# Patient Record
Sex: FEMALE | Race: OTHER | HISPANIC OR LATINO | URBAN - METROPOLITAN AREA
[De-identification: names, ages, dates, MRNs, and addresses within clinical notes are randomized per-mention and may not be internally consistent; named-entity substitution may affect disease eponyms.]

---

## 2022-07-17 RX ORDER — OXYTOCIN 10 UNIT/ML
333.33 VIAL (ML) INJECTION
Qty: 20 | Refills: 0 | Status: DISCONTINUED | OUTPATIENT
Start: 2022-07-22 | End: 2022-07-24

## 2022-07-22 ENCOUNTER — INPATIENT (INPATIENT)
Facility: HOSPITAL | Age: 27
LOS: 1 days | Discharge: HOME | End: 2022-07-24
Attending: OBSTETRICS & GYNECOLOGY | Admitting: OBSTETRICS & GYNECOLOGY
Payer: COMMERCIAL

## 2022-07-22 VITALS
RESPIRATION RATE: 14 BRPM | SYSTOLIC BLOOD PRESSURE: 108 MMHG | DIASTOLIC BLOOD PRESSURE: 65 MMHG | TEMPERATURE: 98 F | HEART RATE: 66 BPM

## 2022-07-22 DIAGNOSIS — O34.219 MATERNAL CARE FOR UNSPECIFIED TYPE SCAR FROM PREVIOUS CESAREAN DELIVERY: ICD-10-CM

## 2022-07-22 DIAGNOSIS — Z98.891 HISTORY OF UTERINE SCAR FROM PREVIOUS SURGERY: Chronic | ICD-10-CM

## 2022-07-22 DIAGNOSIS — Z30.2 ENCOUNTER FOR STERILIZATION: ICD-10-CM

## 2022-07-22 LAB
ABO RH CONFIRMATION: SIGNIFICANT CHANGE UP
BASOPHILS # BLD AUTO: 0.02 K/UL — SIGNIFICANT CHANGE UP (ref 0–0.2)
BASOPHILS NFR BLD AUTO: 0.3 % — SIGNIFICANT CHANGE UP (ref 0–2)
BLD GP AB SCN SERPL QL: SIGNIFICANT CHANGE UP
COVID-19 SPIKE DOMAIN AB INTERP: POSITIVE
COVID-19 SPIKE DOMAIN ANTIBODY RESULT: >250 U/ML — HIGH
EOSINOPHIL # BLD AUTO: 0.02 K/UL — SIGNIFICANT CHANGE UP (ref 0–0.5)
EOSINOPHIL NFR BLD AUTO: 0.3 % — SIGNIFICANT CHANGE UP (ref 0–6)
HCT VFR BLD CALC: 33.4 % — LOW (ref 34.5–45)
HGB BLD-MCNC: 11.2 G/DL — LOW (ref 11.5–15.5)
HIV 1 & 2 AB SERPL IA.RAPID: SIGNIFICANT CHANGE UP
IMM GRANULOCYTES NFR BLD AUTO: 0.3 % — SIGNIFICANT CHANGE UP (ref 0–1.5)
LYMPHOCYTES # BLD AUTO: 1.35 K/UL — SIGNIFICANT CHANGE UP (ref 1–3.3)
LYMPHOCYTES # BLD AUTO: 18.4 % — SIGNIFICANT CHANGE UP (ref 13–44)
MCHC RBC-ENTMCNC: 28 PG — SIGNIFICANT CHANGE UP (ref 27–34)
MCHC RBC-ENTMCNC: 33.5 GM/DL — SIGNIFICANT CHANGE UP (ref 32–36)
MCV RBC AUTO: 83.5 FL — SIGNIFICANT CHANGE UP (ref 80–100)
MONOCYTES # BLD AUTO: 0.26 K/UL — SIGNIFICANT CHANGE UP (ref 0–0.9)
MONOCYTES NFR BLD AUTO: 3.6 % — SIGNIFICANT CHANGE UP (ref 2–14)
NEUTROPHILS # BLD AUTO: 5.65 K/UL — SIGNIFICANT CHANGE UP (ref 1.8–7.4)
NEUTROPHILS NFR BLD AUTO: 77.1 % — HIGH (ref 43–77)
PLATELET # BLD AUTO: 222 K/UL — SIGNIFICANT CHANGE UP (ref 150–400)
RBC # BLD: 4 M/UL — SIGNIFICANT CHANGE UP (ref 3.8–5.2)
RBC # FLD: 14 % — SIGNIFICANT CHANGE UP (ref 10.3–14.5)
SARS-COV-2 IGG+IGM SERPL QL IA: >250 U/ML — HIGH
SARS-COV-2 IGG+IGM SERPL QL IA: POSITIVE
SARS-COV-2 RNA SPEC QL NAA+PROBE: SIGNIFICANT CHANGE UP
WBC # BLD: 7.32 K/UL — SIGNIFICANT CHANGE UP (ref 3.8–10.5)
WBC # FLD AUTO: 7.32 K/UL — SIGNIFICANT CHANGE UP (ref 3.8–10.5)

## 2022-07-22 PROCEDURE — 88307 TISSUE EXAM BY PATHOLOGIST: CPT | Mod: 26

## 2022-07-22 PROCEDURE — 88302 TISSUE EXAM BY PATHOLOGIST: CPT | Mod: 26

## 2022-07-22 RX ORDER — ENOXAPARIN SODIUM 100 MG/ML
40 INJECTION SUBCUTANEOUS EVERY 24 HOURS
Refills: 0 | Status: DISCONTINUED | OUTPATIENT
Start: 2022-07-23 | End: 2022-07-24

## 2022-07-22 RX ORDER — LANOLIN
1 OINTMENT (GRAM) TOPICAL EVERY 6 HOURS
Refills: 0 | Status: DISCONTINUED | OUTPATIENT
Start: 2022-07-22 | End: 2022-07-24

## 2022-07-22 RX ORDER — TRANEXAMIC ACID 100 MG/ML
1000 INJECTION, SOLUTION INTRAVENOUS ONCE
Refills: 0 | Status: COMPLETED | OUTPATIENT
Start: 2022-07-22 | End: 2022-07-22

## 2022-07-22 RX ORDER — SODIUM CHLORIDE 9 MG/ML
500 INJECTION, SOLUTION INTRAVENOUS ONCE
Refills: 0 | Status: COMPLETED | OUTPATIENT
Start: 2022-07-22 | End: 2022-07-22

## 2022-07-22 RX ORDER — DIPHENHYDRAMINE HCL 50 MG
25 CAPSULE ORAL EVERY 6 HOURS
Refills: 0 | Status: DISCONTINUED | OUTPATIENT
Start: 2022-07-22 | End: 2022-07-24

## 2022-07-22 RX ORDER — MAGNESIUM HYDROXIDE 400 MG/1
30 TABLET, CHEWABLE ORAL
Refills: 0 | Status: DISCONTINUED | OUTPATIENT
Start: 2022-07-22 | End: 2022-07-24

## 2022-07-22 RX ORDER — SODIUM CHLORIDE 9 MG/ML
1000 INJECTION, SOLUTION INTRAVENOUS
Refills: 0 | Status: DISCONTINUED | OUTPATIENT
Start: 2022-07-22 | End: 2022-07-22

## 2022-07-22 RX ORDER — KETOROLAC TROMETHAMINE 30 MG/ML
30 SYRINGE (ML) INJECTION EVERY 6 HOURS
Refills: 0 | Status: DISCONTINUED | OUTPATIENT
Start: 2022-07-22 | End: 2022-07-23

## 2022-07-22 RX ORDER — OXYCODONE HYDROCHLORIDE 5 MG/1
5 TABLET ORAL ONCE
Refills: 0 | Status: DISCONTINUED | OUTPATIENT
Start: 2022-07-22 | End: 2022-07-24

## 2022-07-22 RX ORDER — SODIUM CHLORIDE 9 MG/ML
1000 INJECTION, SOLUTION INTRAVENOUS
Refills: 0 | Status: DISCONTINUED | OUTPATIENT
Start: 2022-07-22 | End: 2022-07-24

## 2022-07-22 RX ORDER — OXYTOCIN 10 UNIT/ML
333.33 VIAL (ML) INJECTION
Qty: 20 | Refills: 0 | Status: DISCONTINUED | OUTPATIENT
Start: 2022-07-22 | End: 2022-07-24

## 2022-07-22 RX ORDER — SODIUM CHLORIDE 9 MG/ML
1000 INJECTION, SOLUTION INTRAVENOUS ONCE
Refills: 0 | Status: COMPLETED | OUTPATIENT
Start: 2022-07-22 | End: 2022-07-22

## 2022-07-22 RX ORDER — OXYCODONE HYDROCHLORIDE 5 MG/1
5 TABLET ORAL
Refills: 0 | Status: DISCONTINUED | OUTPATIENT
Start: 2022-07-22 | End: 2022-07-24

## 2022-07-22 RX ORDER — CEFAZOLIN SODIUM 1 G
2000 VIAL (EA) INJECTION ONCE
Refills: 0 | Status: COMPLETED | OUTPATIENT
Start: 2022-07-22 | End: 2022-07-22

## 2022-07-22 RX ORDER — TETANUS TOXOID, REDUCED DIPHTHERIA TOXOID AND ACELLULAR PERTUSSIS VACCINE, ADSORBED 5; 2.5; 8; 8; 2.5 [IU]/.5ML; [IU]/.5ML; UG/.5ML; UG/.5ML; UG/.5ML
0.5 SUSPENSION INTRAMUSCULAR ONCE
Refills: 0 | Status: DISCONTINUED | OUTPATIENT
Start: 2022-07-22 | End: 2022-07-24

## 2022-07-22 RX ORDER — ACETAMINOPHEN 500 MG
975 TABLET ORAL
Refills: 0 | Status: DISCONTINUED | OUTPATIENT
Start: 2022-07-22 | End: 2022-07-24

## 2022-07-22 RX ORDER — IBUPROFEN 200 MG
600 TABLET ORAL EVERY 6 HOURS
Refills: 0 | Status: COMPLETED | OUTPATIENT
Start: 2022-07-22 | End: 2023-06-20

## 2022-07-22 RX ORDER — SIMETHICONE 80 MG/1
80 TABLET, CHEWABLE ORAL EVERY 4 HOURS
Refills: 0 | Status: DISCONTINUED | OUTPATIENT
Start: 2022-07-22 | End: 2022-07-24

## 2022-07-22 RX ORDER — CITRIC ACID/SODIUM CITRATE 300-500 MG
30 SOLUTION, ORAL ORAL ONCE
Refills: 0 | Status: COMPLETED | OUTPATIENT
Start: 2022-07-22 | End: 2022-07-22

## 2022-07-22 RX ORDER — FAMOTIDINE 10 MG/ML
20 INJECTION INTRAVENOUS ONCE
Refills: 0 | Status: COMPLETED | OUTPATIENT
Start: 2022-07-22 | End: 2022-07-22

## 2022-07-22 RX ADMIN — Medication 1000 MILLIUNIT(S)/MIN: at 19:50

## 2022-07-22 RX ADMIN — SODIUM CHLORIDE 125 MILLILITER(S): 9 INJECTION, SOLUTION INTRAVENOUS at 18:15

## 2022-07-22 RX ADMIN — Medication 30 MILLILITER(S): at 18:25

## 2022-07-22 RX ADMIN — Medication 1000 MILLIUNIT(S)/MIN: at 19:05

## 2022-07-22 RX ADMIN — Medication 100 MILLIGRAM(S): at 18:51

## 2022-07-22 RX ADMIN — SODIUM CHLORIDE 500 MILLILITER(S): 9 INJECTION, SOLUTION INTRAVENOUS at 21:53

## 2022-07-22 RX ADMIN — FAMOTIDINE 20 MILLIGRAM(S): 10 INJECTION INTRAVENOUS at 18:25

## 2022-07-22 RX ADMIN — TRANEXAMIC ACID 220 MILLIGRAM(S): 100 INJECTION, SOLUTION INTRAVENOUS at 18:28

## 2022-07-22 RX ADMIN — SODIUM CHLORIDE 125 MILLILITER(S): 9 INJECTION, SOLUTION INTRAVENOUS at 17:11

## 2022-07-22 RX ADMIN — SODIUM CHLORIDE 2000 MILLILITER(S): 9 INJECTION, SOLUTION INTRAVENOUS at 16:00

## 2022-07-22 NOTE — OB PROVIDER H&P - ASSESSMENT
26 year old G_ at 38w4d by LMP/US who presents to L&D for rCS for SGA (EFW 5%ile 6/15) + BS. 26 year old  at 38w4d by 1st trimester US who presents to L&D for rCS for SGA (EFW 5%ile 6/15) + BS.    A/P:   -Admit to L&D  -Consent  -Admission labs  -NPO, except ice chips   -IV fluids  -Fetus: Cat I tracing. Continuous toco and fetal monitoring.   -GBS: no GBS ppx required   -Analgesia: spinal     Discussed with Dr. Wells

## 2022-07-22 NOTE — OB RN DELIVERY SUMMARY - NS_SEPSISRSKCALC_OBGYN_ALL_OB_FT
EOS calculated successfully. EOS Risk Factor: 0.5/1000 live births ([facility] national incidence); GA=38w4d; Temp=97.7; ROM=0; GBS='Negative'; Antibiotics='No antibiotics or any antibiotics < 2 hrs prior to birth'

## 2022-07-22 NOTE — OB PROVIDER H&P - NS_OTHER_OBGYN_ALL_OB_FT
UTI in 2nd and 3rd trimester with culture positive for E coli, E faecalis, genital HSV, succenturiate placenta

## 2022-07-22 NOTE — OB RN INTRAOPERATIVE NOTE - NSSELHIDDEN_OBGYN_ALL_OB_FT
[NS_DeliveryAttending1_OBGYN_ALL_OB_FT:[id number]==],[NS_DeliveryAssist1_OBGYN_ALL_OB_FT:[id number]=],[NS_DeliveryRN_OBGYN_ALL_OB_FT:[id number]==]

## 2022-07-22 NOTE — OB PROVIDER H&P - NSHPPHYSICALEXAM_GEN_ALL_CORE
HR: 66 (22 Jul 2022 15:54) (66 - 66)  BP: 108/65 (22 Jul 2022 15:54) (108/65 - 108/65)    Bedside Sono: vertex, posterior placenta  FHT: baseline FHR 150s, moderate variability, +accels, -decels  toco: irregular contractions

## 2022-07-22 NOTE — OB PROVIDER DELIVERY SUMMARY - NSPROVIDERDELIVERYNOTE_OBGYN_ALL_OB_FT
Brief  Delivery Summary    Procedure: repeat  Delivery with bilateral salpingectomy  Findings: Viable female infant delivered in cephalic presentation at 1904, placenta delivered at 1905.  Apgar scores 8/9  Weight: 2770g  EBL: 736 ml  UOP: 200 ml  IVF: 1000ml  Complications: FGR    Details  Hysterotomy reapproximated in 1 layer(s) w/ 0 monocryl- running locked   skin closed with staples    dictation number  [id number]

## 2022-07-22 NOTE — OB RN PATIENT PROFILE - FALL HARM RISK - UNIVERSAL INTERVENTIONS
Bed in lowest position, wheels locked, appropriate side rails in place/Call bell, personal items and telephone in reach/Instruct patient to call for assistance before getting out of bed or chair/Non-slip footwear when patient is out of bed/[place] to call system/Physically safe environment - no spills, clutter or unnecessary equipment/Purposeful Proactive Rounding/Room/bathroom lighting operational, light cord in reach

## 2022-07-22 NOTE — OB RN DELIVERY SUMMARY - NSSELHIDDEN_OBGYN_ALL_OB_FT
[NS_DeliveryAttending1_OBGYN_ALL_OB_FT:[id number]==],[NS_DeliveryAssist1_OBGYN_ALL_OB_FT:[id number]=],[NS_DeliveryRN_OBGYN_ALL_OB_FT:[id number]==]

## 2022-07-22 NOTE — OB PROVIDER H&P - HISTORY OF PRESENT ILLNESS
26 year old G_ at 38w4d by LMP/US who presents to L&D for rCS for SGA (EFW 5%ile 6/15) + BS.     LOUISE: 08/01/22   LMP:      Pregnancy course:   SGA, EFW 5%ile 6/15   UTI in 2nd and 3rd trimester with culture positive for E coli, E faecalis   Genital HSV   Succenturiate placenta   Obhx: C/S   Gynhx:   Pmhx:    Pshx: C/S   Meds: Cephalexin, Macrobid 100mg, Valtrex   Allergies:    SocialHx:     BMI:    Ultrasound: vtx, posterior   EFW: 1726g, 5%ile 6/15  26 year old  at 38w4d by 1st trimester US who presents to L&D for rCS for SGA (EFW 5%ile 6/15) + BS. Patient denies LOF, VB, or contractions. Reports good fetal movements. Denies any fevers, chills, SOB, CP, or urinary symptoms.    LOUISE: 22   LMP:  unknown    Pregnancy course:   SGA, EFW 5%ile 6/15   UTI in 2nd and 3rd trimester with culture positive for E coli, E faecalis   Genital HSV   Succenturiate placenta     Obhx:  G1:  pCS 2/2 arrest of dilation  G2:  rCS  G3: current  Gynhx: denies hx of ovarian cysts, fibroids, STIs  Pmhx:  denies  Pshx: C/S x2, right hand surgery  Meds: PNVs, Valtrex   Allergies:  NKDA  SocialHx: denies alcohol use, illicit drug use, and smoking during this pregnancy    Ultrasound: vtx, posterior   EFW: 1726g, 5%ile 6/15

## 2022-07-22 NOTE — OB PROVIDER DELIVERY SUMMARY - NSSELHIDDEN_OBGYN_ALL_OB_FT
[NS_DeliveryAttending1_OBGYN_ALL_OB_FT:[id number]==],[NS_DeliveryAssist1_OBGYN_ALL_OB_FT:[id number]=]

## 2022-07-22 NOTE — OB RN DELIVERY SUMMARY - NS_SKINTOSKINA_OBGYN_ALL_OB
Implemented All Fall Risk Interventions:  [place] to call system. Call bell, personal items and telephone within reach. Instruct patient to call for assistance. Room bathroom lighting operational. Non-slip footwear when patient is off stretcher. Physically safe environment: no spills, clutter or unnecessary equipment. Stretcher in lowest position, wheels locked, appropriate side rails in place. Provide visual cue, wrist band, yellow gown, etc. Monitor gait and stability. Monitor for mental status changes and reorient to person, place, and time. Review medications for side effects contributing to fall risk. Reinforce activity limits and safety measures with patient and family. Was done through completion of first feed

## 2022-07-23 LAB
BASOPHILS # BLD AUTO: 0.01 K/UL — SIGNIFICANT CHANGE UP (ref 0–0.2)
BASOPHILS NFR BLD AUTO: 0.1 % — SIGNIFICANT CHANGE UP (ref 0–2)
EOSINOPHIL # BLD AUTO: 0 K/UL — SIGNIFICANT CHANGE UP (ref 0–0.5)
EOSINOPHIL NFR BLD AUTO: 0 % — SIGNIFICANT CHANGE UP (ref 0–6)
HCT VFR BLD CALC: 27 % — LOW (ref 34.5–45)
HGB BLD-MCNC: 8.7 G/DL — LOW (ref 11.5–15.5)
HIV 1+2 AB+HIV1 P24 AG SERPL QL IA: SIGNIFICANT CHANGE UP
IMM GRANULOCYTES NFR BLD AUTO: 0.3 % — SIGNIFICANT CHANGE UP (ref 0–1.5)
LYMPHOCYTES # BLD AUTO: 1.13 K/UL — SIGNIFICANT CHANGE UP (ref 1–3.3)
LYMPHOCYTES # BLD AUTO: 11.2 % — LOW (ref 13–44)
MCHC RBC-ENTMCNC: 27.3 PG — SIGNIFICANT CHANGE UP (ref 27–34)
MCHC RBC-ENTMCNC: 32.2 GM/DL — SIGNIFICANT CHANGE UP (ref 32–36)
MCV RBC AUTO: 84.6 FL — SIGNIFICANT CHANGE UP (ref 80–100)
MEV IGG SER-ACNC: 188 AU/ML — SIGNIFICANT CHANGE UP
MEV IGG+IGM SER-IMP: POSITIVE — SIGNIFICANT CHANGE UP
MONOCYTES # BLD AUTO: 0.49 K/UL — SIGNIFICANT CHANGE UP (ref 0–0.9)
MONOCYTES NFR BLD AUTO: 4.8 % — SIGNIFICANT CHANGE UP (ref 2–14)
NEUTROPHILS # BLD AUTO: 8.45 K/UL — HIGH (ref 1.8–7.4)
NEUTROPHILS NFR BLD AUTO: 83.6 % — HIGH (ref 43–77)
PLATELET # BLD AUTO: 210 K/UL — SIGNIFICANT CHANGE UP (ref 150–400)
RBC # BLD: 3.19 M/UL — LOW (ref 3.8–5.2)
RBC # FLD: 13.8 % — SIGNIFICANT CHANGE UP (ref 10.3–14.5)
T PALLIDUM AB TITR SER: NEGATIVE — SIGNIFICANT CHANGE UP
WBC # BLD: 10.11 K/UL — SIGNIFICANT CHANGE UP (ref 3.8–10.5)
WBC # FLD AUTO: 10.11 K/UL — SIGNIFICANT CHANGE UP (ref 3.8–10.5)

## 2022-07-23 RX ORDER — IRON SUCROSE 20 MG/ML
300 INJECTION, SOLUTION INTRAVENOUS ONCE
Refills: 0 | Status: COMPLETED | OUTPATIENT
Start: 2022-07-23 | End: 2022-07-23

## 2022-07-23 RX ORDER — ASCORBIC ACID 60 MG
500 TABLET,CHEWABLE ORAL THREE TIMES A DAY
Refills: 0 | Status: DISCONTINUED | OUTPATIENT
Start: 2022-07-23 | End: 2022-07-23

## 2022-07-23 RX ORDER — IBUPROFEN 200 MG
600 TABLET ORAL EVERY 6 HOURS
Refills: 0 | Status: DISCONTINUED | OUTPATIENT
Start: 2022-07-23 | End: 2022-07-24

## 2022-07-23 RX ORDER — ACETAMINOPHEN 500 MG
3 TABLET ORAL
Qty: 36 | Refills: 0
Start: 2022-07-23 | End: 2022-07-25

## 2022-07-23 RX ORDER — IBUPROFEN 200 MG
1 TABLET ORAL
Qty: 28 | Refills: 0
Start: 2022-07-23 | End: 2022-07-29

## 2022-07-23 RX ORDER — FERROUS SULFATE 325(65) MG
325 TABLET ORAL THREE TIMES A DAY
Refills: 0 | Status: DISCONTINUED | OUTPATIENT
Start: 2022-07-23 | End: 2022-07-23

## 2022-07-23 RX ADMIN — Medication 975 MILLIGRAM(S): at 15:02

## 2022-07-23 RX ADMIN — Medication 975 MILLIGRAM(S): at 20:26

## 2022-07-23 RX ADMIN — Medication 975 MILLIGRAM(S): at 16:20

## 2022-07-23 RX ADMIN — Medication 30 MILLIGRAM(S): at 13:05

## 2022-07-23 RX ADMIN — Medication 30 MILLIGRAM(S): at 18:27

## 2022-07-23 RX ADMIN — Medication 30 MILLIGRAM(S): at 14:00

## 2022-07-23 RX ADMIN — Medication 30 MILLIGRAM(S): at 06:27

## 2022-07-23 RX ADMIN — Medication 975 MILLIGRAM(S): at 10:00

## 2022-07-23 RX ADMIN — IRON SUCROSE 176.67 MILLIGRAM(S): 20 INJECTION, SOLUTION INTRAVENOUS at 15:02

## 2022-07-23 RX ADMIN — Medication 1 TABLET(S): at 13:05

## 2022-07-23 RX ADMIN — ENOXAPARIN SODIUM 40 MILLIGRAM(S): 100 INJECTION SUBCUTANEOUS at 09:38

## 2022-07-23 RX ADMIN — Medication 975 MILLIGRAM(S): at 09:38

## 2022-07-23 RX ADMIN — Medication 30 MILLIGRAM(S): at 00:18

## 2022-07-23 RX ADMIN — Medication 975 MILLIGRAM(S): at 03:13

## 2022-07-23 NOTE — PROGRESS NOTE ADULT - ASSESSMENT
A/P:  RONALDO RODARTE is a 26y  now POD#1 s/p repeat  section at 38.4 weeks gestation, uncomplicated.  -Vital signs stable  - f/u TOV 1300  -Hgb: 11.2-> AM labs pending   -Voiding, tolerating PO  -Advance care as tolerated   -Continue routine postpartum and postoperative care and education  -Healthy female infant  -DVT ppx: SCDs in place, lovenox  -Dispo: Anticipate discharge to home once meeting all milestones and pending attending approval. A/P:  RONALDO RODARTE is a 26y  now POD#1 s/p repeat  section at 38.4 weeks gestation, uncomplicated.  -Vital signs stable  - f/u TOV 1300  -Hgb: 11.2-> AM labs pending   -Voiding, tolerating PO  -Advance care as tolerated   -Continue routine postpartum and postoperative care and education  -Healthy female infant  -DVT ppx: lovenox  -Dispo: Anticipate discharge to home once meeting all milestones and pending attending approval.

## 2022-07-23 NOTE — DISCHARGE NOTE OB - HOSPITAL COURSE
Patient underwent a  delivery with bilateral salpingectomy. Post-op course was uncomplicated. Pain is well controlled with PRN medication. She has no difficulty with ambulation, voiding, or PO intake. Lab values and vital signs are within normal limits prior to discharge.

## 2022-07-23 NOTE — DISCHARGE NOTE OB - PLAN OF CARE
1) Please take ibuprofen and/or Tylenol as needed for pain as prescribed.  2) Nothing in the vagina for 6 weeks (including no sex, no tampons, and no douching).  3) Please call your doctor for a follow up your postpartum appointment in 1 week for incision check.  4) Please continue taking vitamins postpartum.   5) Please call the office sooner if you have heavy vaginal bleeding, severe abdominal pain, or fever > 100.4F.  6) You may resume regular daily activity as tolerated

## 2022-07-23 NOTE — DISCHARGE NOTE OB - PATIENT PORTAL LINK FT
You can access the FollowMyHealth Patient Portal offered by [facility] by registering at the following website: [email or web address]. By joining [employer]’s FollowMyHealth portal, you will also be able to view your health information using other applications (apps) compatible with our system.

## 2022-07-23 NOTE — DISCHARGE NOTE OB - CARE PROVIDER_API CALL
Della Wells)  Obstetrics and Gynecology  [address]  [place] [address]  Phone: [phone]  Fax: [phone]  Follow Up Time:

## 2022-07-23 NOTE — DISCHARGE NOTE OB - NS MD DC FALL RISK RISK
For information on Fall & Injury Prevention, visit: [email or web address] OR  [email or web address] OR  https://www.cdc.gov/steadi/patient.html

## 2022-07-23 NOTE — DISCHARGE NOTE OB - CARE PLAN
1 Principal Discharge DX:	Delivery normal  Assessment and plan of treatment:	1) Please take ibuprofen and/or Tylenol as needed for pain as prescribed.  2) Nothing in the vagina for 6 weeks (including no sex, no tampons, and no douching).  3) Please call your doctor for a follow up your postpartum appointment in 1 week for incision check.  4) Please continue taking vitamins postpartum.   5) Please call the office sooner if you have heavy vaginal bleeding, severe abdominal pain, or fever > 100.4F.  6) You may resume regular daily activity as tolerated

## 2022-07-24 VITALS — HEART RATE: 90 BPM | SYSTOLIC BLOOD PRESSURE: 111 MMHG | DIASTOLIC BLOOD PRESSURE: 70 MMHG

## 2022-07-24 RX ORDER — IBUPROFEN 200 MG
1 TABLET ORAL
Qty: 56 | Refills: 0
Start: 2022-07-24 | End: 2022-08-06

## 2022-07-24 RX ORDER — ACETAMINOPHEN 500 MG
3 TABLET ORAL
Qty: 168 | Refills: 0
Start: 2022-07-24 | End: 2022-08-06

## 2022-07-24 RX ADMIN — Medication 1 TABLET(S): at 11:13

## 2022-07-24 RX ADMIN — ENOXAPARIN SODIUM 40 MILLIGRAM(S): 100 INJECTION SUBCUTANEOUS at 08:15

## 2022-07-24 RX ADMIN — Medication 600 MILLIGRAM(S): at 00:11

## 2022-07-24 RX ADMIN — Medication 975 MILLIGRAM(S): at 09:04

## 2022-07-24 RX ADMIN — Medication 600 MILLIGRAM(S): at 11:13

## 2022-07-24 RX ADMIN — Medication 975 MILLIGRAM(S): at 08:14

## 2022-07-24 RX ADMIN — Medication 600 MILLIGRAM(S): at 06:52

## 2022-07-24 RX ADMIN — Medication 600 MILLIGRAM(S): at 12:10

## 2022-07-24 NOTE — PROGRESS NOTE ADULT - ASSESSMENT
A/P:  RONALDO RODARTE is a 26y  now POD#2 s/p repeat  section at 38.4 weeks gestation, uncomplicated.  -Vital signs stable  -Spontaneously voiding  -Hgb: 11.2-> 8.7, IV venofer given  -Tolerating PO  -Advance care as tolerated   -Continue routine postpartum and postoperative care and education  -Healthy female infant  -DVT ppx: lovenox and SCDs while in bed  -Dispo: Anticipate discharge to home today pending attending approval.

## 2022-07-24 NOTE — PROGRESS NOTE ADULT - SUBJECTIVE AND OBJECTIVE BOX
RONALDO RODARTE is a 26y  now POD#2 s/p repeat  section at 38.4 weeks gestation, uncomplicated.    S:    No acute events overnight.   Pain controlled with current treatment regimen.   She is ambulating without difficulty and tolerating PO.   + flatus/-BM/+ voiding   She endorses appropriate lochia, which is decreasing.   She is breastfeeding and bottle feeding  She denies fevers, chills, nausea and vomiting.   She denies lightheadedness, dizziness, palpitations, chest pain and SOB.     O:    Vital Signs Last 24 Hrs  T(C): 36.6 (2022 03:50), Max: 37.1 (2022 08:37)  T(F): 97.8 (2022 03:50), Max: 98.7 (2022 08:37)  HR: 94 (2022 03:50) (55 - 94)  BP: 94/54 (2022 03:50) (94/54 - 99/64)  RR: 16 (2022 03:50) (16 - 16)  SpO2: 97% (2022 03:50) (97% - 100%)    Gen: NAD, AOx3, re  Resp: breathing comfortably on room air  Abdomen:  Soft, non-tender, non-distended  Incision: Clean/dry/intact with staples in place   Uterus:  Fundus firm below umbilicus  VE:  Expected lochia  Ext:  b/l LE non-tender                 8.7    10.11 )-----------( 210      ( 2022 04:59 )             27.0         
Pt seen and examined  POD #1  no complains  pain controlled  ambulating, tolerates PO  afebrile  stable vitals  abd soft, incision clean  uterus firm,lochia min  no calf tenderness  labs - mild anemia, asymptomatic    plan  routine p/op care  encourage ambulation  shower  IV iron  repeat CBC tomorrow possible discharge home tomorrow
RONALDO RODARTE is a 26y  now POD#1 s/p repeat  section at 38.4 weeks gestation, uncomplicated.    S:    No acute events overnight.   The patient has no complaints.  Pain controlled with current treatment regimen.   She is ambulating without difficulty and tolerating PO.   + flatus/-BM/- voiding   She endorses appropriate lochia, which is decreasing.   She is breastfeeding without difficulty, also plans to bottlefeed at home    She denies fevers, chills, nausea and vomiting.   She denies lightheadedness, dizziness, palpitations, chest pain and SOB.     O:    T(C): 36.9 (22 @ 23:30), Max: 36.9 (22 @ 21:10)  HR: 58 (22 @ 23:30) (58 - 71)  BP: 100/63 (22 @ 23:30) (89/61 - 108/65)  RR: 16 (22 @ 23:30) (12 - 20)  SpO2: 99% (22 @ 23:30) (99% - 100%)    Gen: NAD, AOx3, resting comfortably on room air  Abdomen:  Soft, non-tender, non-distended  Incision: Clean/dry/intact with staples in place   Uterus:  Fundus firm below umbilicus  VE:  Expected lochia  Ext:  b/l LE non-tender                           11.2   7.32  )-----------( 222      ( 2022 16:30 )             33.4

## 2022-07-24 NOTE — PROGRESS NOTE ADULT - ATTENDING COMMENTS
Patient was seen and evaluated at bedside. She has no complaints today. She is ambulating, tolerating regular diet, and voiding freely. Vitals signs stable. Labs reviewed and within normal limits. She is cleared for discharge with follow up in 1-2 weeks for wound check

## 2022-08-01 LAB — SURGICAL PATHOLOGY STUDY: SIGNIFICANT CHANGE UP

## 2022-08-11 PROCEDURE — 86769 SARS-COV-2 COVID-19 ANTIBODY: CPT

## 2022-08-11 PROCEDURE — 59050 FETAL MONITOR W/REPORT: CPT

## 2022-08-11 PROCEDURE — 86850 RBC ANTIBODY SCREEN: CPT

## 2022-08-11 PROCEDURE — 59025 FETAL NON-STRESS TEST: CPT

## 2022-08-11 PROCEDURE — 86765 RUBEOLA ANTIBODY: CPT

## 2022-08-11 PROCEDURE — 36415 COLL VENOUS BLD VENIPUNCTURE: CPT

## 2022-08-11 PROCEDURE — 88307 TISSUE EXAM BY PATHOLOGIST: CPT

## 2022-08-11 PROCEDURE — 87389 HIV-1 AG W/HIV-1&-2 AB AG IA: CPT

## 2022-08-11 PROCEDURE — U0005: CPT

## 2022-08-11 PROCEDURE — 86703 HIV-1/HIV-2 1 RESULT ANTBDY: CPT

## 2022-08-11 PROCEDURE — U0003: CPT

## 2022-08-11 PROCEDURE — 88302 TISSUE EXAM BY PATHOLOGIST: CPT

## 2022-08-11 PROCEDURE — 85025 COMPLETE CBC W/AUTO DIFF WBC: CPT

## 2022-08-11 PROCEDURE — C1889: CPT

## 2022-08-11 PROCEDURE — C1765: CPT

## 2022-08-11 PROCEDURE — 86780 TREPONEMA PALLIDUM: CPT

## 2022-08-11 PROCEDURE — 86900 BLOOD TYPING SEROLOGIC ABO: CPT

## 2022-08-11 PROCEDURE — 86901 BLOOD TYPING SEROLOGIC RH(D): CPT

## 2024-09-03 NOTE — OB RN PATIENT PROFILE - IF RESULT GREATER THAN 35 DAYS OLD SELECT STATUS
Writer returned call after hearing back from both Supervisor and Provider. Client is OK to be seen today.    N/A